# Patient Record
Sex: MALE | Race: WHITE | NOT HISPANIC OR LATINO | URBAN - METROPOLITAN AREA
[De-identification: names, ages, dates, MRNs, and addresses within clinical notes are randomized per-mention and may not be internally consistent; named-entity substitution may affect disease eponyms.]

---

## 2017-10-20 ENCOUNTER — EMERGENCY (EMERGENCY)
Facility: HOSPITAL | Age: 72
LOS: 1 days | Discharge: HOME | End: 2017-10-20
Attending: EMERGENCY MEDICINE | Admitting: EMERGENCY MEDICINE
Payer: MEDICARE

## 2017-10-20 VITALS
DIASTOLIC BLOOD PRESSURE: 82 MMHG | HEART RATE: 60 BPM | WEIGHT: 145.95 LBS | RESPIRATION RATE: 12 BRPM | TEMPERATURE: 98 F | SYSTOLIC BLOOD PRESSURE: 132 MMHG | OXYGEN SATURATION: 97 %

## 2017-10-20 VITALS
HEART RATE: 60 BPM | OXYGEN SATURATION: 99 % | SYSTOLIC BLOOD PRESSURE: 124 MMHG | TEMPERATURE: 98 F | DIASTOLIC BLOOD PRESSURE: 78 MMHG | RESPIRATION RATE: 14 BRPM

## 2017-10-20 DIAGNOSIS — R07.89 OTHER CHEST PAIN: ICD-10-CM

## 2017-10-20 DIAGNOSIS — I10 ESSENTIAL (PRIMARY) HYPERTENSION: ICD-10-CM

## 2017-10-20 DIAGNOSIS — J40 BRONCHITIS, NOT SPECIFIED AS ACUTE OR CHRONIC: ICD-10-CM

## 2017-10-20 DIAGNOSIS — E03.9 HYPOTHYROIDISM, UNSPECIFIED: ICD-10-CM

## 2017-10-20 LAB
ALBUMIN SERPL ELPH-MCNC: 2.8 G/DL — LOW (ref 3.3–5)
ALP SERPL-CCNC: 73 U/L — SIGNIFICANT CHANGE UP (ref 40–120)
ALT FLD-CCNC: 30 U/L — SIGNIFICANT CHANGE UP (ref 12–78)
AMYLASE P1 CFR SERPL: 70 U/L — SIGNIFICANT CHANGE UP (ref 25–115)
ANION GAP SERPL CALC-SCNC: 8 MMOL/L — SIGNIFICANT CHANGE UP (ref 5–17)
AST SERPL-CCNC: 19 U/L — SIGNIFICANT CHANGE UP (ref 15–37)
BASOPHILS # BLD AUTO: 0.1 K/UL — SIGNIFICANT CHANGE UP (ref 0–0.2)
BASOPHILS NFR BLD AUTO: 0.9 % — SIGNIFICANT CHANGE UP (ref 0–2)
BILIRUB SERPL-MCNC: 0.2 MG/DL — SIGNIFICANT CHANGE UP (ref 0.2–1.2)
BUN SERPL-MCNC: 15 MG/DL — SIGNIFICANT CHANGE UP (ref 7–23)
CALCIUM SERPL-MCNC: 8 MG/DL — LOW (ref 8.5–10.1)
CHLORIDE SERPL-SCNC: 95 MMOL/L — LOW (ref 96–108)
CK MB BLD-MCNC: 1.4 % — SIGNIFICANT CHANGE UP (ref 0–3.5)
CK MB BLD-MCNC: 2 % — SIGNIFICANT CHANGE UP (ref 0–3.5)
CK MB CFR SERPL CALC: 1 NG/ML — SIGNIFICANT CHANGE UP (ref 0–3.6)
CK MB CFR SERPL CALC: 1.2 NG/ML — SIGNIFICANT CHANGE UP (ref 0–3.6)
CK SERPL-CCNC: 59 U/L — SIGNIFICANT CHANGE UP (ref 26–308)
CK SERPL-CCNC: 73 U/L — SIGNIFICANT CHANGE UP (ref 26–308)
CO2 SERPL-SCNC: 29 MMOL/L — SIGNIFICANT CHANGE UP (ref 22–31)
CREAT SERPL-MCNC: 1.3 MG/DL — SIGNIFICANT CHANGE UP (ref 0.5–1.3)
EOSINOPHIL # BLD AUTO: 0.4 K/UL — SIGNIFICANT CHANGE UP (ref 0–0.5)
EOSINOPHIL NFR BLD AUTO: 3.3 % — SIGNIFICANT CHANGE UP (ref 0–6)
GLUCOSE SERPL-MCNC: 100 MG/DL — HIGH (ref 70–99)
HCT VFR BLD CALC: 44.1 % — SIGNIFICANT CHANGE UP (ref 39–50)
HGB BLD-MCNC: 13.9 G/DL — SIGNIFICANT CHANGE UP (ref 13–17)
LIDOCAIN IGE QN: 149 U/L — SIGNIFICANT CHANGE UP (ref 73–393)
LYMPHOCYTES # BLD AUTO: 19.9 % — SIGNIFICANT CHANGE UP (ref 13–44)
LYMPHOCYTES # BLD AUTO: 2.4 K/UL — SIGNIFICANT CHANGE UP (ref 1–3.3)
MCHC RBC-ENTMCNC: 25.7 PG — LOW (ref 27–34)
MCHC RBC-ENTMCNC: 31.6 GM/DL — LOW (ref 32–36)
MCV RBC AUTO: 81.4 FL — SIGNIFICANT CHANGE UP (ref 80–100)
MONOCYTES # BLD AUTO: 1.2 K/UL — HIGH (ref 0–0.9)
MONOCYTES NFR BLD AUTO: 10.4 % — HIGH (ref 1–9)
NEUTROPHILS # BLD AUTO: 7.8 K/UL — HIGH (ref 1.8–7.4)
NEUTROPHILS NFR BLD AUTO: 65.5 % — SIGNIFICANT CHANGE UP (ref 43–77)
PLATELET # BLD AUTO: 328 K/UL — SIGNIFICANT CHANGE UP (ref 150–400)
POTASSIUM SERPL-MCNC: 3.8 MMOL/L — SIGNIFICANT CHANGE UP (ref 3.5–5.3)
POTASSIUM SERPL-SCNC: 3.8 MMOL/L — SIGNIFICANT CHANGE UP (ref 3.5–5.3)
PROT SERPL-MCNC: 6.8 G/DL — SIGNIFICANT CHANGE UP (ref 6–8.3)
RBC # BLD: 5.42 M/UL — SIGNIFICANT CHANGE UP (ref 4.2–5.8)
RBC # FLD: 14 % — SIGNIFICANT CHANGE UP (ref 10.3–14.5)
SODIUM SERPL-SCNC: 132 MMOL/L — LOW (ref 135–145)
TROPONIN I SERPL-MCNC: <.015 NG/ML — SIGNIFICANT CHANGE UP (ref 0.01–0.04)
TROPONIN I SERPL-MCNC: <.015 NG/ML — SIGNIFICANT CHANGE UP (ref 0.01–0.04)
WBC # BLD: 11.9 K/UL — HIGH (ref 3.8–10.5)
WBC # FLD AUTO: 11.9 K/UL — HIGH (ref 3.8–10.5)

## 2017-10-20 PROCEDURE — 85027 COMPLETE CBC AUTOMATED: CPT

## 2017-10-20 PROCEDURE — 93005 ELECTROCARDIOGRAM TRACING: CPT

## 2017-10-20 PROCEDURE — 84484 ASSAY OF TROPONIN QUANT: CPT

## 2017-10-20 PROCEDURE — 99284 EMERGENCY DEPT VISIT MOD MDM: CPT | Mod: 25

## 2017-10-20 PROCEDURE — 80053 COMPREHEN METABOLIC PANEL: CPT

## 2017-10-20 PROCEDURE — 71010: CPT | Mod: 26

## 2017-10-20 PROCEDURE — 36415 COLL VENOUS BLD VENIPUNCTURE: CPT

## 2017-10-20 PROCEDURE — 71045 X-RAY EXAM CHEST 1 VIEW: CPT

## 2017-10-20 PROCEDURE — 96374 THER/PROPH/DIAG INJ IV PUSH: CPT

## 2017-10-20 PROCEDURE — 82553 CREATINE MB FRACTION: CPT

## 2017-10-20 PROCEDURE — 82150 ASSAY OF AMYLASE: CPT

## 2017-10-20 PROCEDURE — 82550 ASSAY OF CK (CPK): CPT

## 2017-10-20 PROCEDURE — 83690 ASSAY OF LIPASE: CPT

## 2017-10-20 PROCEDURE — 99285 EMERGENCY DEPT VISIT HI MDM: CPT

## 2017-10-20 RX ORDER — AZITHROMYCIN 500 MG/1
1 TABLET, FILM COATED ORAL
Qty: 3 | Refills: 0 | OUTPATIENT
Start: 2017-10-20 | End: 2017-10-23

## 2017-10-20 RX ORDER — SODIUM CHLORIDE 9 MG/ML
3 INJECTION INTRAMUSCULAR; INTRAVENOUS; SUBCUTANEOUS ONCE
Qty: 0 | Refills: 0 | Status: COMPLETED | OUTPATIENT
Start: 2017-10-20 | End: 2017-10-20

## 2017-10-20 RX ORDER — KETOROLAC TROMETHAMINE 30 MG/ML
30 SYRINGE (ML) INJECTION ONCE
Qty: 0 | Refills: 0 | Status: DISCONTINUED | OUTPATIENT
Start: 2017-10-20 | End: 2017-10-20

## 2017-10-20 RX ORDER — LOSARTAN POTASSIUM 100 MG/1
0 TABLET, FILM COATED ORAL
Qty: 0 | Refills: 0 | COMMUNITY

## 2017-10-20 RX ORDER — LISINOPRIL 2.5 MG/1
1 TABLET ORAL
Qty: 0 | Refills: 0 | COMMUNITY

## 2017-10-20 RX ORDER — SODIUM CHLORIDE 9 MG/ML
1000 INJECTION INTRAMUSCULAR; INTRAVENOUS; SUBCUTANEOUS ONCE
Qty: 0 | Refills: 0 | Status: COMPLETED | OUTPATIENT
Start: 2017-10-20 | End: 2017-10-20

## 2017-10-20 RX ADMIN — SODIUM CHLORIDE 1000 MILLILITER(S): 9 INJECTION INTRAMUSCULAR; INTRAVENOUS; SUBCUTANEOUS at 21:05

## 2017-10-20 RX ADMIN — Medication 30 MILLIGRAM(S): at 21:05

## 2017-10-20 RX ADMIN — SODIUM CHLORIDE 3 MILLILITER(S): 9 INJECTION INTRAMUSCULAR; INTRAVENOUS; SUBCUTANEOUS at 19:45

## 2017-10-20 NOTE — ED PROVIDER NOTE - PROGRESS NOTE DETAILS
Spoke with orthopedic resident will see pt in the am requests admission to medicine.  Pt will likely require cardiac clearance

## 2017-10-20 NOTE — ED PROVIDER NOTE - NEURO NEGATIVE STATEMENT, MLM
Statement Selected
no loss of consciousness, no gait abnormality, no headache, no sensory deficits, and no weakness.

## 2017-10-20 NOTE — ED PROVIDER NOTE - NEUROLOGICAL, MLM
Alert and oriented, no focal deficits, no motor or sensory deficits. Alert and oriented, no focal deficits, no motor or sensory deficits. No meningeal signs

## 2017-10-20 NOTE — ED PROVIDER NOTE - CARDIAC RHYTHM
regular/TTP to left upper chest wall regular/TTP to left upper chest wall (reproducible chest wall pain )

## 2017-10-20 NOTE — ED PROVIDER NOTE - CARE PLAN
Principal Discharge DX:	Atypical chest pain  Instructions for follow-up, activity and diet:	Return to the ED for any new or worsening symptoms  Take your medication as prescribed  Follow up with your PMD in 2-3 days for a recheck   Follow up with your cardiologist tomorrow   Advance activity as tolerated  Secondary Diagnosis:	Bronchitis

## 2017-10-20 NOTE — ED ADULT NURSE NOTE - OBJECTIVE STATEMENT
patient a/o x 4 with a calm affect c/o left sided chest pains for 1 week becoming more intense today.  patient denies difficulty breathing, pending initial lab results.  EKG completed.  patient placed on continuous cardiac monitoring, RN will monitor patient closely.

## 2017-10-20 NOTE — ED PROVIDER NOTE - OBJECTIVE STATEMENT
Pt is a 70 yo male who presents to the ED with a cc of chest wall pain.  PMHx of ventricular heart block by pt report, HTN, hypothyroidism.  Pt reports that for the last week he has been experiencing left upper chest wall pain reproducible to palpation with sore throat and non-productive cough.  Symptoms have been constant.  No increase in pain on exertion.  He was recently changed to lisinopril for HTN because his previous medication did not seem to be helping.  Denies fever, chills, N/V/D/C, abd pain, ext numbness or weakness.  He does report frontal HA with no visual changes, and some ringing in his ears.  Denies neck pain or still neck

## 2017-10-21 PROBLEM — Z00.00 ENCOUNTER FOR PREVENTIVE HEALTH EXAMINATION: Status: ACTIVE | Noted: 2017-10-21

## 2017-11-06 ENCOUNTER — EMERGENCY (EMERGENCY)
Facility: HOSPITAL | Age: 72
LOS: 1 days | Discharge: HOME | End: 2017-11-06
Attending: EMERGENCY MEDICINE | Admitting: EMERGENCY MEDICINE
Payer: MEDICARE

## 2017-11-06 VITALS
WEIGHT: 141.98 LBS | RESPIRATION RATE: 16 BRPM | HEART RATE: 63 BPM | DIASTOLIC BLOOD PRESSURE: 69 MMHG | OXYGEN SATURATION: 97 % | TEMPERATURE: 98 F | SYSTOLIC BLOOD PRESSURE: 128 MMHG

## 2017-11-06 DIAGNOSIS — R42 DIZZINESS AND GIDDINESS: ICD-10-CM

## 2017-11-06 DIAGNOSIS — I10 ESSENTIAL (PRIMARY) HYPERTENSION: ICD-10-CM

## 2017-11-06 LAB
ALBUMIN SERPL ELPH-MCNC: 2.7 G/DL — LOW (ref 3.3–5)
ALP SERPL-CCNC: 73 U/L — SIGNIFICANT CHANGE UP (ref 40–120)
ALT FLD-CCNC: 33 U/L — SIGNIFICANT CHANGE UP (ref 12–78)
ANION GAP SERPL CALC-SCNC: 7 MMOL/L — SIGNIFICANT CHANGE UP (ref 5–17)
AST SERPL-CCNC: 15 U/L — SIGNIFICANT CHANGE UP (ref 15–37)
BASOPHILS # BLD AUTO: 0.1 K/UL — SIGNIFICANT CHANGE UP (ref 0–0.2)
BASOPHILS NFR BLD AUTO: 1.1 % — SIGNIFICANT CHANGE UP (ref 0–2)
BILIRUB SERPL-MCNC: 0.2 MG/DL — SIGNIFICANT CHANGE UP (ref 0.2–1.2)
BUN SERPL-MCNC: 13 MG/DL — SIGNIFICANT CHANGE UP (ref 7–23)
CALCIUM SERPL-MCNC: 8.2 MG/DL — LOW (ref 8.5–10.1)
CHLORIDE SERPL-SCNC: 99 MMOL/L — SIGNIFICANT CHANGE UP (ref 96–108)
CK MB BLD-MCNC: 2.5 % — SIGNIFICANT CHANGE UP (ref 0–3.5)
CK MB CFR SERPL CALC: 0.7 NG/ML — SIGNIFICANT CHANGE UP (ref 0–3.6)
CK SERPL-CCNC: 28 U/L — SIGNIFICANT CHANGE UP (ref 26–308)
CO2 SERPL-SCNC: 26 MMOL/L — SIGNIFICANT CHANGE UP (ref 22–31)
CREAT SERPL-MCNC: 0.77 MG/DL — SIGNIFICANT CHANGE UP (ref 0.5–1.3)
EOSINOPHIL # BLD AUTO: 0.5 K/UL — SIGNIFICANT CHANGE UP (ref 0–0.5)
EOSINOPHIL NFR BLD AUTO: 4.1 % — SIGNIFICANT CHANGE UP (ref 0–6)
GLUCOSE SERPL-MCNC: 93 MG/DL — SIGNIFICANT CHANGE UP (ref 70–99)
HCT VFR BLD CALC: 46.2 % — SIGNIFICANT CHANGE UP (ref 39–50)
HGB BLD-MCNC: 14.6 G/DL — SIGNIFICANT CHANGE UP (ref 13–17)
LYMPHOCYTES # BLD AUTO: 19.2 % — SIGNIFICANT CHANGE UP (ref 13–44)
LYMPHOCYTES # BLD AUTO: 2.4 K/UL — SIGNIFICANT CHANGE UP (ref 1–3.3)
MCHC RBC-ENTMCNC: 25.2 PG — LOW (ref 27–34)
MCHC RBC-ENTMCNC: 31.7 GM/DL — LOW (ref 32–36)
MCV RBC AUTO: 79.4 FL — LOW (ref 80–100)
MONOCYTES # BLD AUTO: 1.2 K/UL — HIGH (ref 0–0.9)
MONOCYTES NFR BLD AUTO: 9.4 % — HIGH (ref 1–9)
NEUTROPHILS # BLD AUTO: 8.4 K/UL — HIGH (ref 1.8–7.4)
NEUTROPHILS NFR BLD AUTO: 66.2 % — SIGNIFICANT CHANGE UP (ref 43–77)
NT-PROBNP SERPL-SCNC: 97 PG/ML — SIGNIFICANT CHANGE UP (ref 0–125)
PLATELET # BLD AUTO: 361 K/UL — SIGNIFICANT CHANGE UP (ref 150–400)
POTASSIUM SERPL-MCNC: 3.9 MMOL/L — SIGNIFICANT CHANGE UP (ref 3.5–5.3)
POTASSIUM SERPL-SCNC: 3.9 MMOL/L — SIGNIFICANT CHANGE UP (ref 3.5–5.3)
PROT SERPL-MCNC: 6.8 G/DL — SIGNIFICANT CHANGE UP (ref 6–8.3)
RBC # BLD: 5.82 M/UL — HIGH (ref 4.2–5.8)
RBC # FLD: 14.3 % — SIGNIFICANT CHANGE UP (ref 10.3–14.5)
SODIUM SERPL-SCNC: 132 MMOL/L — LOW (ref 135–145)
TROPONIN I SERPL-MCNC: <.015 NG/ML — SIGNIFICANT CHANGE UP (ref 0.01–0.04)
WBC # BLD: 12.7 K/UL — HIGH (ref 3.8–10.5)
WBC # FLD AUTO: 12.7 K/UL — HIGH (ref 3.8–10.5)

## 2017-11-06 PROCEDURE — 70450 CT HEAD/BRAIN W/O DYE: CPT | Mod: 26

## 2017-11-06 PROCEDURE — 99285 EMERGENCY DEPT VISIT HI MDM: CPT

## 2017-11-06 PROCEDURE — 71010: CPT | Mod: 26

## 2017-11-06 NOTE — ED ADULT TRIAGE NOTE - CHIEF COMPLAINT QUOTE
c/o dizziness X a week, worst today. chest pain started X 2 - 3 weeks.  Meclizine PO prescribed by PMD last Saturday, no relief.

## 2017-11-06 NOTE — ED ADULT NURSE NOTE - CHPI ED SYMPTOMS NEG
no numbness/no nausea/no change in level of consciousness/no fever/no vomiting/no loss of consciousness/no weakness/no blurred vision/no confusion

## 2017-11-06 NOTE — ED ADULT NURSE NOTE - OBJECTIVE STATEMENT
c/o worsening dizziness x 1 week. c/o worsening dizziness x 1 week.  Patient is a and o x 3. no acute distress noted. VS stable. MSpencer

## 2017-11-07 VITALS
OXYGEN SATURATION: 98 % | DIASTOLIC BLOOD PRESSURE: 60 MMHG | SYSTOLIC BLOOD PRESSURE: 135 MMHG | HEART RATE: 70 BPM | RESPIRATION RATE: 16 BRPM

## 2017-11-07 LAB
CK MB BLD-MCNC: 1.7 % — SIGNIFICANT CHANGE UP (ref 0–3.5)
CK MB BLD-MCNC: 2.7 % — SIGNIFICANT CHANGE UP (ref 0–3.5)
CK MB CFR SERPL CALC: 0.6 NG/ML — SIGNIFICANT CHANGE UP (ref 0–3.6)
CK MB CFR SERPL CALC: 0.7 NG/ML — SIGNIFICANT CHANGE UP (ref 0–3.6)
CK SERPL-CCNC: 26 U/L — SIGNIFICANT CHANGE UP (ref 26–308)
CK SERPL-CCNC: 35 U/L — SIGNIFICANT CHANGE UP (ref 26–308)
TROPONIN I SERPL-MCNC: <.015 NG/ML — SIGNIFICANT CHANGE UP (ref 0.01–0.04)
TROPONIN I SERPL-MCNC: <.015 NG/ML — SIGNIFICANT CHANGE UP (ref 0.01–0.04)

## 2017-11-07 PROCEDURE — 84484 ASSAY OF TROPONIN QUANT: CPT

## 2017-11-07 PROCEDURE — 82553 CREATINE MB FRACTION: CPT

## 2017-11-07 PROCEDURE — 71045 X-RAY EXAM CHEST 1 VIEW: CPT

## 2017-11-07 PROCEDURE — 83880 ASSAY OF NATRIURETIC PEPTIDE: CPT

## 2017-11-07 PROCEDURE — 99284 EMERGENCY DEPT VISIT MOD MDM: CPT | Mod: 25

## 2017-11-07 PROCEDURE — 85027 COMPLETE CBC AUTOMATED: CPT

## 2017-11-07 PROCEDURE — 93005 ELECTROCARDIOGRAM TRACING: CPT

## 2017-11-07 PROCEDURE — 36415 COLL VENOUS BLD VENIPUNCTURE: CPT

## 2017-11-07 PROCEDURE — 80053 COMPREHEN METABOLIC PANEL: CPT

## 2017-11-07 PROCEDURE — 82550 ASSAY OF CK (CPK): CPT

## 2017-11-07 PROCEDURE — 36000 PLACE NEEDLE IN VEIN: CPT

## 2017-11-07 PROCEDURE — 70450 CT HEAD/BRAIN W/O DYE: CPT

## 2017-11-07 RX ORDER — MECLIZINE HCL 12.5 MG
1 TABLET ORAL
Qty: 15 | Refills: 0 | OUTPATIENT
Start: 2017-11-07 | End: 2017-11-12

## 2017-11-07 NOTE — CONSULT NOTE ADULT - SUBJECTIVE AND OBJECTIVE BOX
[facility]         Belle Meyer, Maritza, Danya, Evangelista, Marky Butler        [phone] (office)    CHIEF COMPLAINT: Patient is a 71y old  Male who presents with a chief complaint of chest pain and dizziness    HPI: 71M with PMH of HTN presents with dizziness and chest pain for the past week. Reports chest pain is 8/10, constant, left sided and occasionally radiates to the left arm. Reports going to a doctor on Saturday for symptoms and was told to follow up with another doctor (doesn't recall either name), decided to come to the ED instead. Pain is reproducible to palpation. Patient reports laying down makes the pain worse. Reports he was here two weeks ago for similar symptoms. Reports dizziness, frontal headache, dry cough, and ringing in tears. Denies fever, n/v/c/d, increased chest pain with exertion, and palpitations. Denies recent travel and sick contacts.     Able to use stairs and perform activities of daily living independently. Lives with wife and children. 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     PAST MEDICAL & SURGICAL HISTORY:  Heart block: ventricular  HTN (hypertension)  Hyponatremia  No significant past surgical history      SOCIAL HISTORY: No active tobacco, alcohol or illicit drug use    FAMILY HISTORY:  No pertinent family history in first degree relatives      Outpatient medications:    MEDICATIONS  (STANDING):    MEDICATIONS  (PRN):      Allergies    No Known Allergies    Intolerances        REVIEW OF SYSTEMS: Is negative for eye, ENT, GI, , allergic, dermatologic, musculoskeletal and neurologic, except as described above.    VITAL SIGNS:   Vital Signs Last 24 Hrs  T(C): 36.8 (07 Nov 2017 05:48), Max: 36.8 (07 Nov 2017 05:48)  T(F): 98.3 (07 Nov 2017 05:48), Max: 98.3 (07 Nov 2017 05:48)  HR: 70 (07 Nov 2017 09:11) (63 - 81)  BP: 135/60 (07 Nov 2017 09:11) (116/78 - 138/80)  BP(mean): --  RR: 16 (07 Nov 2017 09:11) (16 - 16)  SpO2: 98% (07 Nov 2017 09:11) (97% - 100%)    I&O's Summary      PHYSICAL EXAM:    Constitutional: NAD, awake and alert, well-developed  Eyes:  EOMI, no oral cyanosis, conjunctivae clear, anicteric.  Pulmonary: Non-labored, breath sounds are clear bilaterally, no wheezing, rales or rhonchi  Cardiovascular:  regular S1 and S2. No murmur.  No rubs, gallops or clicks  Gastrointestinal: Bowel Sounds present, soft, nontender.   Lymph: No peripheral edema.   Neurological: Alert, strength and sensitivity are grossly intact  Skin: No obvious lesions/rashes.   Psych:  Mood & affect appropriate .    LABS: All Labs Reviewed:                        14.6   12.7  )-----------( 361      ( 06 Nov 2017 20:51 )             46.2     06 Nov 2017 20:51    132    |  99     |  13     ----------------------------<  93     3.9     |  26     |  0.77     Ca    8.2        06 Nov 2017 20:51    TPro  6.8    /  Alb  2.7    /  TBili  0.2    /  DBili  x      /  AST  15     /  ALT  33     /  AlkPhos  73     06 Nov 2017 20:51      CARDIAC MARKERS ( 07 Nov 2017 03:55 )  <.015 ng/mL / x     / 26 U/L / x     / 0.7 ng/mL  CARDIAC MARKERS ( 06 Nov 2017 20:51 )  <.015 ng/mL / x     / 28 U/L / x     / 0.7 ng/mL      Blood Culture:   11-06 @ 20:51  Pro Bnp 97        RADIOLOGY:    EKG:    Impression/Plan: [facility]         Belle Meyer, Maritza, Danya, Evangelista, Marky Butler        [phone] (office)    CHIEF COMPLAINT: Patient is a 71y old  Male who presents with a chief complaint of chest pain and dizziness    HPI: 71M with PMH of HTN presents with dizziness and chest pain for the past week. Reports chest pain is 8/10, constant, left sided and occasionally radiates to the left arm. Reports going to a doctor on Saturday for symptoms and was told to follow up with another doctor (doesn't recall either name), decided to come to the ED instead. Pain is reproducible to palpation. Patient reports laying down makes the pain worse. Reports he was here two weeks ago for similar symptoms. Reports dizziness, frontal headache, dry cough, and ringing in tears. Denies fever, n/v/c/d, increased chest pain with exertion, and palpitations. Denies recent travel and sick contacts.     Able to use stairs and perform activities of daily living independently. Lives with wife and children. 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     PAST MEDICAL & SURGICAL HISTORY:  Heart block: ventricular  HTN (hypertension)  Hyponatremia  No significant past surgical history      SOCIAL HISTORY: No active tobacco, alcohol or illicit drug use    FAMILY HISTORY:  No pertinent family history in first degree relatives      Outpatient medications:    MEDICATIONS  (STANDING):    MEDICATIONS  (PRN):      Allergies    No Known Allergies    Intolerances        REVIEW OF SYSTEMS: Is negative for eye, ENT, GI, , allergic, dermatologic, musculoskeletal and neurologic, except as described above.    VITAL SIGNS:   Vital Signs Last 24 Hrs  T(C): 36.8 (07 Nov 2017 05:48), Max: 36.8 (07 Nov 2017 05:48)  T(F): 98.3 (07 Nov 2017 05:48), Max: 98.3 (07 Nov 2017 05:48)  HR: 70 (07 Nov 2017 09:11) (63 - 81)  BP: 135/60 (07 Nov 2017 09:11) (116/78 - 138/80)  BP(mean): --  RR: 16 (07 Nov 2017 09:11) (16 - 16)  SpO2: 98% (07 Nov 2017 09:11) (97% - 100%)    I&O's Summary      PHYSICAL EXAM:    Constitutional: NAD, awake and alert, well-developed  Eyes:  EOMI, no oral cyanosis, conjunctivae clear, anicteric.  Pulmonary: Non-labored, breath sounds are clear bilaterally, no wheezing, rales or rhonchi  Cardiovascular:  regular S1 and S2. No murmur.  No rubs, gallops or clicks  Gastrointestinal: Bowel Sounds present, soft, nontender.   Lymph: No peripheral edema.   Neurological: Alert, strength and sensitivity are grossly intact  Skin: No obvious lesions/rashes.   Psych:  Mood & affect appropriate .    LABS: All Labs Reviewed:                        14.6   12.7  )-----------( 361      ( 06 Nov 2017 20:51 )             46.2     06 Nov 2017 20:51    132    |  99     |  13     ----------------------------<  93     3.9     |  26     |  0.77     Ca    8.2        06 Nov 2017 20:51    TPro  6.8    /  Alb  2.7    /  TBili  0.2    /  DBili  x      /  AST  15     /  ALT  33     /  AlkPhos  73     06 Nov 2017 20:51      CARDIAC MARKERS ( 07 Nov 2017 03:55 )  <.015 ng/mL / x     / 26 U/L / x     / 0.7 ng/mL  CARDIAC MARKERS ( 06 Nov 2017 20:51 )  <.015 ng/mL / x     / 28 U/L / x     / 0.7 ng/mL      Blood Culture:   11-06 @ 20:51  Pro Bnp 97           EKG: SB 57 [facility]         Belle Meyer, Maritza, Danya, Evangelista, Marky Butler        [phone] (office)    CHIEF COMPLAINT: Patient is a 71y old  Male who presents with a chief complaint of chest pain and dizziness    HPI: 71M with PMH of HTN presents with dizziness and chest pain for the past week. Reports chest pain is 8/10, constant, left sided and occasionally radiates to the left arm. Reports going to a doctor on Saturday for symptoms and was told to follow up with another doctor (doesn't recall either name), decided to come to the ED instead. Pain is reproducible to palpation. Patient reports laying down makes the pain worse. Reports he was here two weeks ago for similar symptoms. Reports dizziness, frontal headache, dry cough, and ringing in tears. Denies fever, n/v/c/d, increased chest pain with exertion, and palpitations. Denies recent travel and sick contacts.     Able to use stairs and perform activities of daily living independently. Lives with wife and children. 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     PAST MEDICAL & SURGICAL HISTORY:  Heart block: ventricular  HTN (hypertension)  Hyponatremia  No significant past surgical history      SOCIAL HISTORY: No active tobacco, alcohol or illicit drug use    FAMILY HISTORY:  No pertinent family history in first degree relatives      Outpatient medications:    MEDICATIONS  (STANDING):    MEDICATIONS  (PRN):      Allergies    No Known Allergies    Intolerances        REVIEW OF SYSTEMS: Is negative for eye, ENT, GI, , allergic, dermatologic, musculoskeletal and neurologic, except as described above.    VITAL SIGNS:   Vital Signs Last 24 Hrs  T(C): 36.8 (07 Nov 2017 05:48), Max: 36.8 (07 Nov 2017 05:48)  T(F): 98.3 (07 Nov 2017 05:48), Max: 98.3 (07 Nov 2017 05:48)  HR: 70 (07 Nov 2017 09:11) (63 - 81)  BP: 135/60 (07 Nov 2017 09:11) (116/78 - 138/80)  BP(mean): --  RR: 16 (07 Nov 2017 09:11) (16 - 16)  SpO2: 98% (07 Nov 2017 09:11) (97% - 100%)    I&O's Summary      PHYSICAL EXAM:    Constitutional: NAD, awake and alert, well-developed  Eyes:  EOMI, no oral cyanosis, conjunctivae clear, anicteric.  Pulmonary: Non-labored, breath sounds are clear bilaterally, no wheezing, rales or rhonchi  Cardiovascular:  regular S1 and S2. 2/6 systolic murmur.  No rubs, gallops or clicks  Gastrointestinal: Bowel Sounds present, soft, nontender.   Lymph: No peripheral edema.   Neurological: Alert, strength and sensitivity are grossly intact  Skin: No obvious lesions/rashes.   Psych:  Mood & affect appropriate .    LABS: All Labs Reviewed:                        14.6   12.7  )-----------( 361      ( 06 Nov 2017 20:51 )             46.2     06 Nov 2017 20:51    132    |  99     |  13     ----------------------------<  93     3.9     |  26     |  0.77     Ca    8.2        06 Nov 2017 20:51    TPro  6.8    /  Alb  2.7    /  TBili  0.2    /  DBili  x      /  AST  15     /  ALT  33     /  AlkPhos  73     06 Nov 2017 20:51      CARDIAC MARKERS ( 07 Nov 2017 03:55 )  <.015 ng/mL / x     / 26 U/L / x     / 0.7 ng/mL  CARDIAC MARKERS ( 06 Nov 2017 20:51 )  <.015 ng/mL / x     / 28 U/L / x     / 0.7 ng/mL      Blood Culture:   11-06 @ 20:51  Pro Bnp 97           EKG: SB 57

## 2017-11-07 NOTE — CONSULT NOTE ADULT - ASSESSMENT
71M with PMH of HTN presents with dizziness and chest pain for the past week.    -Atypical reproducible chest pain, likely costochondritis. Recommend NSAIDs as needed, which would also help with headaches.    -Doubt ACS. Three negative set of cardiac enzymes. EKG in NS, without evidence of acute ischemia.  -No meaningful volume overload.  -Stable for discharge from cardiac standpoint.  -Follow up outpatient for stress test and echo, patient in agreement.   -BP and HR acceptable. Continue home BP meds.

## 2017-11-07 NOTE — PROGRESS NOTE ADULT - SUBJECTIVE AND OBJECTIVE BOX
neuro cons dict .  likely vertigo, feeling better.  antivert 25 mg tid prn.  ENT EVAL AS OUT PATIENT.  NEURO CABRALES STABLE FOR DC.

## 2017-11-07 NOTE — ED PROVIDER NOTE - OBJECTIVE STATEMENT
A 72 y/o man presents with intermittent left sided chest pain, described as a tightness. radiation to the left arm. no dyspnea. no abdominal pain. no nausea, no vomiting. no diaphoresis. no fever. no chills. no cough. no leg pain/swelling. no weakness. no dizziness. no syncope. no headache. no other complaints. A 70 y/o man presents with intermittent left sided chest pain, described as a tightness. radiation to the left arm. no dyspnea. no abdominal pain. no nausea, no vomiting. no diaphoresis. no neck pain. no jaw pain. no fever. no chills. no cough. no leg pain/swelling. no weakness. + dizziness described as a sensation of the room spinning has intermittently occurred over the past 2 months. no dysarthria. no visual changes. no ataxia. no syncope. no headache. no neck pain. no other complaints. A 72 y/o man presents with intermittent left sided chest pain, described as a tightness. radiation to the left arm. no dyspnea. no abdominal pain. no nausea, no vomiting. no diaphoresis. no neck pain. no jaw pain. no fever. no chills. no cough. no leg pain/swelling. no weakness. + dizziness described as a sensation of the room spinning has intermittently occurred over the past 2 months. no dysarthria. no visual changes. no ataxia. no syncope. no headache. no neck pain. no other complaints. pcp - dr ranjan larsen

## 2017-11-07 NOTE — ED PROVIDER NOTE - CHPI ED SYMPTOMS NEG
no fever/no vomiting/no nausea/no cough/no shortness of breath/no dizziness/no chills/no syncope/no diaphoresis/no back pain no cough/no nausea/no fever/no shortness of breath/no diaphoresis/no syncope/no back pain/no vomiting/no chills

## 2017-11-21 PROBLEM — Z00.00 ENCOUNTER FOR PREVENTIVE HEALTH EXAMINATION: Noted: 2017-11-21

## 2017-12-28 ENCOUNTER — APPOINTMENT (OUTPATIENT)
Dept: CARDIOLOGY | Facility: CLINIC | Age: 72
End: 2017-12-28

## 2020-06-11 NOTE — ED ADULT NURSE NOTE - FINAL NURSING ELECTRONIC SIGNATURE
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 I affirm this is a Patient Initiated Episode with a Patient who has not had a related appointment within my department in the past 7 days or scheduled within the next 24 hours.     Patient identification was verified at the start of the visit: Yes    Total Time: minutes: 11-20 minutes    Note: not billable if this call serves to triage the patient into an appointment for the relevant concern      Esther Nix 07-Nov-2017 10:31

## 2021-12-28 NOTE — ED PROVIDER NOTE - ENMT, MLM
yes Airway patent, Nasal mucosa clear. Mouth with normal mucosa. No tonsilar enlargement or exudate noted

## 2022-05-04 PROBLEM — E87.1 HYPO-OSMOLALITY AND HYPONATREMIA: Chronic | Status: ACTIVE | Noted: 2017-10-20

## 2022-05-04 PROBLEM — I45.9 CONDUCTION DISORDER, UNSPECIFIED: Chronic | Status: ACTIVE | Noted: 2017-10-20

## 2022-05-04 PROBLEM — I10 ESSENTIAL (PRIMARY) HYPERTENSION: Chronic | Status: ACTIVE | Noted: 2017-10-20

## 2022-05-13 ENCOUNTER — APPOINTMENT (OUTPATIENT)
Dept: ULTRASOUND IMAGING | Facility: CLINIC | Age: 77
End: 2022-05-13

## 2025-02-20 NOTE — ED ADULT NURSE NOTE - NSSISCREENINGQ3_ED_A_ED
Nephrology Progress Note  2/20/2025 8:35 AM  Subjective:     Interval History: Zaki Loza is a 54 y.o. male seen this morning requiring nonrebreather increased pulmonary edema overnight also very constipated as well on imaging had 1 enema no bowel movement yet last bowel movement was 2 days ago and although weight is true or not truly accurate weight is down from 260 pounds to 290 pounds will need urgent dialysis morning for fluid and volume removal postop day 1 from TAVR    Data:   Scheduled Meds:   lactulose  20 g Oral TID    sodium phosphate  1 enema Rectal Once    BUPivacaine (PF)  30 mL IntraDERmal Once    sodium chloride flush  5-40 mL IntraVENous 2 times per day    aspirin  81 mg Oral Daily    aminocaproic acid (AMICAR) 10,000 mg in sodium chloride 0.9 % 250 mL infusion bolus  10,000 mg IntraVENous Once    meropenem  500 mg IntraVENous Q24H    sodium thiosulfate  25 g IntraVENous Once per day on Monday Wednesday Friday    [Held by provider] apixaban  5 mg Oral BID    isosorbide mononitrate  30 mg Oral Daily    metoprolol succinate  25 mg Oral Daily    vashe wound therapy   Topical BID    sevelamer  2,400 mg Oral TID WC    aspirin  81 mg Oral Daily    clopidogrel  75 mg Oral Daily    traZODone  50 mg Oral Nightly    amLODIPine  10 mg Oral QAM    atorvastatin  40 mg Oral Daily    cinacalcet  60 mg Oral Daily    citalopram  20 mg Oral Daily    fentaNYL  1 patch TransDERmal Q72H    furosemide  80 mg Oral BID    gabapentin  300 mg Oral TID    rOPINIRole  0.25 mg Oral BID    tiZANidine  2 mg Oral Daily    sodium chloride flush  5-40 mL IntraVENous 2 times per day    insulin lispro  0-8 Units SubCUTAneous 4x Daily AC & HS    famotidine  20 mg Oral Daily     Continuous Infusions:   sodium chloride      sodium chloride      sodium chloride      sodium chloride      sodium chloride      sodium chloride      sodium chloride      sodium chloride 5 mL/hr at 01/30/25 1621    dextrose           CBC   Recent Labs      disorder  #7 anemia  #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  #9 loculated pleural effusion    Plan    #1 doing hemodialysis today and again tomorrow  Remove extra volume of fluid released 3 kg with dialysis today  #2 no acute pathology on ultrasound of the scrotum monitor pain control sodium thiosulfate  #3 status post TAVR on February 19  #4 prior heart cath with stent placement  #5 workup possible discharge planning to rehab if possible ARU  #6 monitor mood and affect somewhat lethargic today work on fluid removal  #7 hemoglobin low stable after transfusion try to keep hemoglobin above 8  #8 status post NG tube currently on laxative therapy to help with bowel movements supportive care bowel rest  May need to consider TPN for nutrition if not improved in 24 hours  #9 prior chest tube supportive care monitor oxygenation  Will follow hopefully with dialysis can improve we will see outpatient response to treatment today             EVE GUAMAN MD, MD     No